# Patient Record
Sex: FEMALE | Race: WHITE | NOT HISPANIC OR LATINO | Employment: PART TIME | RURAL
[De-identification: names, ages, dates, MRNs, and addresses within clinical notes are randomized per-mention and may not be internally consistent; named-entity substitution may affect disease eponyms.]

---
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
## 2022-01-28 ENCOUNTER — OFFICE VISIT (OUTPATIENT)
Dept: OBSTETRICS AND GYNECOLOGY | Facility: CLINIC | Age: 22
End: 2022-01-28

## 2022-01-28 VITALS
DIASTOLIC BLOOD PRESSURE: 70 MMHG | SYSTOLIC BLOOD PRESSURE: 110 MMHG | BODY MASS INDEX: 19.4 KG/M2 | HEIGHT: 62 IN | WEIGHT: 105.4 LBS

## 2022-01-28 DIAGNOSIS — Z30.017 ENCOUNTER FOR INITIAL PRESCRIPTION OF NEXPLANON: Primary | ICD-10-CM

## 2022-01-28 PROCEDURE — 99203 OFFICE O/P NEW LOW 30 MIN: CPT | Performed by: PHYSICIAN ASSISTANT

## 2022-01-28 NOTE — PROGRESS NOTES
"Subjective   Chief Complaint   Patient presents with   • Contraception     Birth control consult, declines STD screening today     Visit billed on time  Xuan Thao is a 21 y.o. year old  presenting to be seen for contraception.  Her mother is present for visit today.  Patient has previously been on OCPs however she has been off of these now for about 1 year.  She is sexually active and monogamous with one male partner.  She is using condoms consistently.  She declines STI screening today.  He is wanting a long-term convenient birth control and would like to get a Nexplanon.  Her LMP was 1/15/2022.  She has monthly cycles lasting 5 to 7 days    History reviewed. No pertinent past medical history.   No current outpatient medications on file.    Current Facility-Administered Medications:   •  [START ON 2022] Etonogestrel (NEXPLANON) 68 MG subdermal implant 1 each, 1 each, Intradermal, Once, Anne-Marie Fine PA-C   No Known Allergies   Past Surgical History:   Procedure Laterality Date   • ADENOIDECTOMY     • TONSILLECTOMY        Social History     Socioeconomic History   • Marital status: Single   Tobacco Use   • Smoking status: Never Smoker   • Smokeless tobacco: Never Used   Vaping Use   • Vaping Use: Never used   Substance and Sexual Activity   • Alcohol use: Never   • Drug use: Never   • Sexual activity: Yes     Partners: Male     Birth control/protection: Condom      Family History   Problem Relation Age of Onset   • Breast cancer Mother    • Diabetes Mother        Review of Systems   Constitutional: Negative for chills, diaphoresis and fever.   Gastrointestinal: Negative.    Genitourinary: Negative for difficulty urinating, dysuria, pelvic pain and vaginal discharge.           Objective   /70   Ht 157.5 cm (62\")   Wt 47.8 kg (105 lb 6.4 oz)   LMP 2022 (Exact Date)   Breastfeeding No   BMI 19.28 kg/m²     Physical Exam  Constitutional:       Appearance: Normal appearance. She is " well-developed and well-groomed.   Eyes:      General: Lids are normal.      Extraocular Movements: Extraocular movements intact.      Conjunctiva/sclera: Conjunctivae normal.   Skin:     General: Skin is warm and dry.      Findings: No bruising or lesion.   Neurological:      Mental Status: She is alert.   Psychiatric:         Attention and Perception: Attention normal.         Mood and Affect: Mood normal.         Speech: Speech normal.         Behavior: Behavior is cooperative.            Result Review :                   Assessment and Plan  Diagnoses and all orders for this visit:    1. Encounter for initial prescription of Nexplanon (Primary)  -     Etonogestrel (NEXPLANON) 68 MG subdermal implant 1 each      Patient Instructions   Brochure given on nexplanon  Call first day of menses to schedule insertion              This note was electronically signed.    Anne-Marie Fine PA-C   January 28, 2022

## 2022-08-24 PROCEDURE — U0004 COV-19 TEST NON-CDC HGH THRU: HCPCS | Performed by: NURSE PRACTITIONER

## 2022-09-09 ENCOUNTER — OFFICE VISIT (OUTPATIENT)
Dept: OBSTETRICS AND GYNECOLOGY | Facility: CLINIC | Age: 22
End: 2022-09-09

## 2022-09-09 VITALS
WEIGHT: 102.4 LBS | SYSTOLIC BLOOD PRESSURE: 100 MMHG | BODY MASS INDEX: 18.84 KG/M2 | HEIGHT: 62 IN | DIASTOLIC BLOOD PRESSURE: 60 MMHG

## 2022-09-09 DIAGNOSIS — Z30.017 NEXPLANON INSERTION: Primary | ICD-10-CM

## 2022-09-09 LAB
B-HCG UR QL: NEGATIVE
EXPIRATION DATE: NORMAL
INTERNAL NEGATIVE CONTROL: NEGATIVE
INTERNAL POSITIVE CONTROL: POSITIVE
Lab: NORMAL

## 2022-09-09 PROCEDURE — 81025 URINE PREGNANCY TEST: CPT | Performed by: PHYSICIAN ASSISTANT

## 2022-09-09 PROCEDURE — 11981 INSERTION DRUG DLVR IMPLANT: CPT | Performed by: PHYSICIAN ASSISTANT

## 2022-09-09 NOTE — PROGRESS NOTES
Nexplanon Insertion    Patient's last menstrual period was 09/05/2022.    Date of procedure:  9/9/2022    Risks and benefits discussed? yes  All questions answered? yes  Consents given by the patient  Written consent obtained? yes    Local anesthesia used:  yes - 1.5 cc's of  Meds; anesthesia local: None, 1% lidocaine with epinephrine    Procedure documentation:    The upper left arm was marked at the intended site of insertion.  Betadine was used to cleanse the skin.  Local anesthesia was injected.  The Nexplanon was placed subdermally without difficulty.  The devise was able to be palpated in the arm by both myself and Faythe.  Steri-strips were then placed across the site of insertion and the arm was wrapped.    She tolerated the procedure well.  There were no complications.  EBL was minimal.    Post procedure instructions: Remove the wrapping in 24 hours and the steri-strips in 5 days.    Follow up needed: PRN    This note was electronically signed.    Anne-Marie Fine PA-C  September 9, 2022